# Patient Record
Sex: MALE | Race: WHITE | NOT HISPANIC OR LATINO
[De-identification: names, ages, dates, MRNs, and addresses within clinical notes are randomized per-mention and may not be internally consistent; named-entity substitution may affect disease eponyms.]

---

## 2021-04-15 PROBLEM — Z00.00 ENCOUNTER FOR PREVENTIVE HEALTH EXAMINATION: Status: ACTIVE | Noted: 2021-04-15

## 2022-03-24 ENCOUNTER — TRANSCRIPTION ENCOUNTER (OUTPATIENT)
Age: 42
End: 2022-03-24

## 2023-05-12 ENCOUNTER — NON-APPOINTMENT (OUTPATIENT)
Age: 43
End: 2023-05-12

## 2023-05-12 ENCOUNTER — APPOINTMENT (OUTPATIENT)
Dept: CARDIOLOGY | Facility: CLINIC | Age: 43
End: 2023-05-12
Payer: COMMERCIAL

## 2023-05-12 VITALS — DIASTOLIC BLOOD PRESSURE: 72 MMHG | SYSTOLIC BLOOD PRESSURE: 118 MMHG

## 2023-05-12 VITALS
OXYGEN SATURATION: 98 % | DIASTOLIC BLOOD PRESSURE: 70 MMHG | BODY MASS INDEX: 28.44 KG/M2 | HEIGHT: 69 IN | WEIGHT: 192 LBS | HEART RATE: 94 BPM | SYSTOLIC BLOOD PRESSURE: 122 MMHG

## 2023-05-12 DIAGNOSIS — Z82.49 FAMILY HISTORY OF ISCHEMIC HEART DISEASE AND OTHER DISEASES OF THE CIRCULATORY SYSTEM: ICD-10-CM

## 2023-05-12 DIAGNOSIS — Z87.39 PERSONAL HISTORY OF OTHER DISEASES OF THE MUSCULOSKELETAL SYSTEM AND CONNECTIVE TISSUE: ICD-10-CM

## 2023-05-12 DIAGNOSIS — Z78.9 OTHER SPECIFIED HEALTH STATUS: ICD-10-CM

## 2023-05-12 PROCEDURE — 99204 OFFICE O/P NEW MOD 45 MIN: CPT | Mod: 25

## 2023-05-12 PROCEDURE — 93000 ELECTROCARDIOGRAM COMPLETE: CPT

## 2023-05-12 NOTE — ASSESSMENT
[FreeTextEntry1] : Reviewed on May 12, 2023.\par EKG as noted above\par Echocardiogram as noted above\par Labs from February 28, 2023.  Normal TSH normal LFT creatinine 1.1 potassium 4.6 sodium 141 CBC stable  HDL 76 total cholesterol 195.

## 2023-05-12 NOTE — REASON FOR VISIT
[Symptom and Test Evaluation] : symptom and test evaluation [FreeTextEntry3] : Dr. Mixon [FreeTextEntry1] : 42-year-old gentleman is referred to me for consultation in presence of\par Complain of heart ache after lying on the left side lasting for 5 to 10 minutes after waking up.  Resolves on its own.  Not similar to his reflux disease symptoms in the past.  Nonexertional.  No radiation no other associated symptoms.  Started about 2 to 3 months ago.\par Recent back surgery.  Functional status is limited though continue to go to gym 4-5 times a week but not able to do aggressive exercises as before.\par He is not on any medications.  Has no significant increased caffeine intake.  He has social alcohol consumption during the weekends.\par He does have a habit of going to bed very close to after eating dinner.\par His medical history negative for any palpitation, PND, orthopnea, pedal edema, visual disturbances or focal weakness.\par Nose near syncopal or syncopal event.\par No claudication pain.\par \par No prior history of microinfarction, coronary artery disease, cerebrovascular accident, peripheral arterial disease, rheumatic fever, thyroid or Lyme disease.\par

## 2023-05-12 NOTE — CARDIOLOGY SUMMARY
[de-identified] : May 12, 2023 normal sinus [de-identified] : Outside echocardiogram.  April 2023.  EF 55%.  Trace to mild mitral regurgitation.  Mild tricuspid regurgitation.

## 2023-05-12 NOTE — PHYSICAL EXAM
[Well Developed] : well developed [Well Nourished] : well nourished [No Acute Distress] : no acute distress [Normal Venous Pressure] : normal venous pressure [No Carotid Bruit] : no carotid bruit [No Murmur] : no murmur [Normal S1, S2] : normal S1, S2 [No Rub] : no rub [No Gallop] : no gallop [Clear Lung Fields] : clear lung fields [Good Air Entry] : good air entry [No Respiratory Distress] : no respiratory distress  [Soft] : abdomen soft [Non Tender] : non-tender [No Masses/organomegaly] : no masses/organomegaly [Normal Bowel Sounds] : normal bowel sounds [Normal Gait] : normal gait [No Edema] : no edema [No Cyanosis] : no cyanosis [No Clubbing] : no clubbing [No Varicosities] : no varicosities [Normal Radial B/L] : normal radial B/L [Normal DP B/L] : normal DP B/L [No Rash] : no rash [Moves all extremities] : moves all extremities [Alert and Oriented] : alert and oriented [Normal Speech] : normal speech

## 2023-05-12 NOTE — DISCUSSION/SUMMARY
[FreeTextEntry1] : 42-year-old gentleman with above medical history active medical problems as noted below\par 1.  Chest pain.  Nonexertional.  No significant CAD risk factors except male gender.  EKG no acute changes.  Echocardiogram preserved EF.  No significant pericardial disease.  Possibility of gastrointestinal or musculoskeletal in nature.  Rule out obstructive CAD by exercise treadmill stress test.\par Reviewed changes in dietary habit, avoid recumbency after eating, try to sleep on the right side and incline position to see whether that makes a difference.  Decreasing alcohol intake and spicy citrus food during the day at dinnertime.\par If nonischemic stress test then consider trial of PPI.\par Further restratification at that point with coronary calcium score would be helpful for long-term cardiovascular prevention.\par \par Lipid panel with LDL cholesterol of 100.  10-year ASCVD risk low.  Continue lifestyle and risk factor modification.  Further recommendation based on coronary calcium score or other preventive cardiac evaluation\par \par Limitation of evaluation was discussed with him.  No change in clinical status he will should contact us immediately or call 911 and go to nearest emergency room.\par \par Counseling regarding low saturated fat, salt and carbohydrate intake was reviewed. Active lifestyle and regular. Exercise along with weight management is advised.\par All the above were at length reviewed. Answered all the questions. Thank you very much for this kind referral. Please do not hesitate to give me a call for any question.\par Part of this transcription was done with voice recognition software and phonetically similar errors are common. I apologize for that. Please do not hesitate to call for any questions due to above.\par \par Sincerely,\par Andrez Reed MD,FACC,HERMINIO\par

## 2023-06-18 ENCOUNTER — NON-APPOINTMENT (OUTPATIENT)
Age: 43
End: 2023-06-18

## 2023-06-19 ENCOUNTER — APPOINTMENT (OUTPATIENT)
Dept: CARDIOLOGY | Facility: CLINIC | Age: 43
End: 2023-06-19
Payer: COMMERCIAL

## 2023-06-19 PROCEDURE — 93015 CV STRESS TEST SUPVJ I&R: CPT

## 2023-08-06 NOTE — PHYSICAL EXAM
[Well Developed] : well developed [Well Nourished] : well nourished [No Acute Distress] : no acute distress [Normal Venous Pressure] : normal venous pressure [No Carotid Bruit] : no carotid bruit [Normal S1, S2] : normal S1, S2 [No Murmur] : no murmur [No Rub] : no rub [No Gallop] : no gallop [Clear Lung Fields] : clear lung fields [Good Air Entry] : good air entry [No Respiratory Distress] : no respiratory distress  [Soft] : abdomen soft [Non Tender] : non-tender [No Masses/organomegaly] : no masses/organomegaly [Normal Bowel Sounds] : normal bowel sounds [Normal Gait] : normal gait [No Edema] : no edema [No Cyanosis] : no cyanosis [No Clubbing] : no clubbing [No Varicosities] : no varicosities [Normal Radial B/L] : normal radial B/L [Normal DP B/L] : normal DP B/L [No Rash] : no rash [Moves all extremities] : moves all extremities [Normal Speech] : normal speech [Alert and Oriented] : alert and oriented

## 2023-08-07 ENCOUNTER — APPOINTMENT (OUTPATIENT)
Dept: CARDIOLOGY | Facility: CLINIC | Age: 43
End: 2023-08-07
Payer: COMMERCIAL

## 2023-08-07 VITALS
OXYGEN SATURATION: 98 % | HEART RATE: 65 BPM | SYSTOLIC BLOOD PRESSURE: 104 MMHG | BODY MASS INDEX: 28.14 KG/M2 | HEIGHT: 69 IN | WEIGHT: 190 LBS | DIASTOLIC BLOOD PRESSURE: 66 MMHG

## 2023-08-07 DIAGNOSIS — R07.89 OTHER CHEST PAIN: ICD-10-CM

## 2023-08-07 DIAGNOSIS — R00.1 BRADYCARDIA, UNSPECIFIED: ICD-10-CM

## 2023-08-07 DIAGNOSIS — Z13.6 ENCOUNTER FOR SCREENING FOR CARDIOVASCULAR DISORDERS: ICD-10-CM

## 2023-08-07 PROCEDURE — 99214 OFFICE O/P EST MOD 30 MIN: CPT

## 2023-08-07 NOTE — ASSESSMENT
[FreeTextEntry1] : Reviewed on May 12, 2023. EKG as noted above Echocardiogram as noted above Labs from February 28, 2023.  Normal TSH normal LFT creatinine 1.1 potassium 4.6 sodium 141 CBC stable  HDL 76 total cholesterol 195.  Reviewed on August 7, 2023. Exercise treadmill stress test reviewed.

## 2023-08-07 NOTE — CARDIOLOGY SUMMARY
[de-identified] : May 12, 2023 normal sinus [de-identified] : June 19, 2023.  12 minutes of Kevin protocol nonischemic [de-identified] : Outside echocardiogram.  April 2023.  EF 55%.  Trace to mild mitral regurgitation.  Mild tricuspid regurgitation.

## 2023-08-07 NOTE — REASON FOR VISIT
[Symptom and Test Evaluation] : symptom and test evaluation [FreeTextEntry3] : Dr. Mixon [FreeTextEntry1] : 43-year-old gentleman is seen in the office for follow-up consultation to review his exercise treadmill stress test.  He was seen recently with Complain of heart ache after lying on the left side lasting for 5 to 10 minutes after waking up.  Resolves on its own.  Not similar to his reflux disease symptoms in the past.  Nonexertional.  No radiation no other associated symptoms.  Started about 2 to 3 months ago. Recent back surgery.  Functional status is limited though continue to go to gym 4-5 times a week but not able to do aggressive exercises as before. He is not on any medications.  Has no significant increased caffeine intake.  He has social alcohol consumption during the weekends.  No prior history of microinfarction, coronary artery disease, cerebrovascular accident, peripheral arterial disease, rheumatic fever, thyroid or Lyme disease.  Today he offers no complaint of chest pain.  No significant shortness of breath PND orthopnea palpitation dizziness lightheadedness near syncopal syncopal event.

## 2023-08-07 NOTE — DISCUSSION/SUMMARY
[FreeTextEntry1] : 43-year-old gentleman with above medical history active medical problems as noted below 1.  Chest pain.  Nonexertional.  No significant CAD risk factors except male gender.  EKG no acute changes.  Echocardiogram preserved EF.  No significant pericardial disease.  Possibility of gastrointestinal or musculoskeletal in nature.  Exercise treadmill stress test nonischemic at a high workload.  Low probability of high risk obstructive CAD. Risk benefits alternatives of further restratification with coronary calcium score reviewed with him.  He understands and agrees.  Prescription done. Limitation of evaluation management reviewed.  Change in clinical status he will contact us immediately.  He will contact us after coronary calcium score so that appropriate review can be done. Reviewed changes in dietary habit, avoid recumbency after eating, try to sleep on the right side and incline position to see whether that makes a difference.  Decreasing alcohol intake and spicy citrus food during the day at dinnertime.  Lipid panel with LDL cholesterol of 100.  10-year ASCVD risk low.  Continue lifestyle and risk factor modification.  Further recommendation based on coronary calcium score or other preventive cardiac evaluation  Limitation of evaluation was discussed with him.  No change in clinical status he will should contact us immediately or call 911 and go to nearest emergency room.  Counseling regarding low saturated fat, salt and carbohydrate intake was reviewed. Active lifestyle and regular. Exercise along with weight management is advised. All the above were at length reviewed. Answered all the questions. Thank you very much for this kind referral. Please do not hesitate to give me a call for any question. Part of this transcription was done with voice recognition software and phonetically similar errors are common. I apologize for that. Please do not hesitate to call for any questions due to above. I will see him as needed. Sincerely, Andrez Reed MD,FACC,HERMINIO

## 2023-08-28 DIAGNOSIS — R91.8 OTHER NONSPECIFIC ABNORMAL FINDING OF LUNG FIELD: ICD-10-CM

## 2023-12-14 ENCOUNTER — NON-APPOINTMENT (OUTPATIENT)
Age: 43
End: 2023-12-14

## 2024-01-28 ENCOUNTER — NON-APPOINTMENT (OUTPATIENT)
Age: 44
End: 2024-01-28

## 2024-05-21 ENCOUNTER — APPOINTMENT (OUTPATIENT)
Dept: CT IMAGING | Facility: CLINIC | Age: 44
End: 2024-05-21
Payer: COMMERCIAL

## 2024-05-21 PROCEDURE — 71250 CT THORAX DX C-: CPT

## 2024-08-12 ENCOUNTER — APPOINTMENT (OUTPATIENT)
Dept: CARDIOLOGY | Facility: CLINIC | Age: 44
End: 2024-08-12